# Patient Record
Sex: FEMALE | Race: AMERICAN INDIAN OR ALASKA NATIVE
[De-identification: names, ages, dates, MRNs, and addresses within clinical notes are randomized per-mention and may not be internally consistent; named-entity substitution may affect disease eponyms.]

---

## 2019-01-11 ENCOUNTER — HOSPITAL ENCOUNTER (OUTPATIENT)
Dept: HOSPITAL 5 - SPVWC | Age: 44
Discharge: HOME | End: 2019-01-11
Attending: OBSTETRICS & GYNECOLOGY
Payer: COMMERCIAL

## 2019-01-11 DIAGNOSIS — R92.0: ICD-10-CM

## 2019-01-11 DIAGNOSIS — N60.22: Primary | ICD-10-CM

## 2019-01-11 PROCEDURE — 88305 TISSUE EXAM BY PATHOLOGIST: CPT

## 2019-01-11 NOTE — MAMMOGRAPHY REPORT
LEFT DIGITAL DIAGNOSTIC MAMMOGRAM: 01/11/19 09:19:00



CLINICAL: For clip placement immediately status post ultrasound biopsy.



COMPARISON:01/02/19



FINDINGS: A biopsy clip is now identified within the previously 

described upper-outer mass. 





IMPRESSION: Concordant clip placement status post ultrasound biopsy.



BI-RADS CATEGORY:  4--Suspicious



Pathology pending.

## 2019-01-11 NOTE — ULTRASOUND REPORT
ULTRASOUND GUIDED NEEDLE CORE BIOPSY LEFT BREAST WITH CLIP PLACEMENT: 

01/11/19



CLINICAL: Left breast mass.



COMPARISON :01/02/19



FINDINGS: The procedure was explained to the patient and informed 

consent was obtained.  



Ultrasound demonstrated the previously described mass at 2 o'clock 15 

cm from the nipple.



I marked the breast with a felt tip marker and a time out was called. 

The skin was prepped with Betadine and anesthetized with 1% lidocaine. 

Needle core biopsy was performed through a tiny dermatotomy using 

ultrasound guidance, 2% lidocaine with epinephrine for deep anesthesia 

and a 14-gauge Achieve biopsy device.  4 cores were obtained and placed 

in formalin.  A clip was deployed within the mass. The patient 

tolerated the procedure well and there were no apparent complications.  

Hemostasis was achieved with minimal pressure and a sterile dressing 

was applied. A two view mammogram demonstrated concordant placement of 

the clip. She left the department in good condition and was given 

instructions for wound care and followup.



IMPRESSION: Uncomplicated ultrasound guided needle core biopsy with 

clip placement left breast.